# Patient Record
Sex: MALE | Race: ASIAN | Employment: STUDENT | URBAN - METROPOLITAN AREA
[De-identification: names, ages, dates, MRNs, and addresses within clinical notes are randomized per-mention and may not be internally consistent; named-entity substitution may affect disease eponyms.]

---

## 2019-11-25 ENCOUNTER — OFFICE VISIT (OUTPATIENT)
Dept: INTERNAL MEDICINE CLINIC | Facility: CLINIC | Age: 24
End: 2019-11-25
Payer: COMMERCIAL

## 2019-11-25 VITALS
DIASTOLIC BLOOD PRESSURE: 68 MMHG | HEIGHT: 67.9 IN | TEMPERATURE: 98 F | SYSTOLIC BLOOD PRESSURE: 106 MMHG | WEIGHT: 127 LBS | RESPIRATION RATE: 16 BRPM | OXYGEN SATURATION: 97 % | BODY MASS INDEX: 19.47 KG/M2 | HEART RATE: 76 BPM

## 2019-11-25 DIAGNOSIS — Z13.29 THYROID DISORDER SCREEN: ICD-10-CM

## 2019-11-25 DIAGNOSIS — Z13.220 LIPID SCREENING: ICD-10-CM

## 2019-11-25 DIAGNOSIS — Z13.89 SCREENING FOR GENITOURINARY CONDITION: ICD-10-CM

## 2019-11-25 DIAGNOSIS — Z13.0 SCREENING, IRON DEFICIENCY ANEMIA: ICD-10-CM

## 2019-11-25 DIAGNOSIS — Z00.00 LABORATORY EXAMINATION ORDERED AS PART OF A ROUTINE GENERAL MEDICAL EXAMINATION: ICD-10-CM

## 2019-11-25 DIAGNOSIS — Z00.00 ANNUAL PHYSICAL EXAM: Primary | ICD-10-CM

## 2019-11-25 PROCEDURE — 99385 PREV VISIT NEW AGE 18-39: CPT | Performed by: INTERNAL MEDICINE

## 2019-11-25 NOTE — PROGRESS NOTES
HPI:    Patient ID: Tiffani Padilla is a 22year old male. HPI  Tiffani Padilla is a 22year old male who presents for a complete physical exam.   HPI:   Pt complains of nothing    PAST MEDICAL, SOCIAL, FAMILY HISTORIES REVIEWED WITH PT  .       Vincent Bautista Body mass index is 19.37 kg/m².    GENERAL: well developed, well nourished,in no apparent distress  SKIN: no rashes,no suspicious lesions  HEENT: atraumatic, normocephalic,ears and throat are clear  EYES:PERRLA, EOMI, normal optic disk,conjunctiva are shayne

## 2019-11-27 ENCOUNTER — TELEPHONE (OUTPATIENT)
Dept: INTERNAL MEDICINE CLINIC | Facility: CLINIC | Age: 24
End: 2019-11-27

## 2019-11-27 DIAGNOSIS — Z00.00 LABORATORY EXAMINATION ORDERED AS PART OF A ROUTINE GENERAL MEDICAL EXAMINATION: Primary | ICD-10-CM

## 2019-11-27 NOTE — TELEPHONE ENCOUNTER
Please change all lab orders on file over to [facility] - pt is there now to have labs done. Fax #[phone].

## 2019-11-28 LAB
ABSOLUTE BASOPHILS: 48 CELLS/UL (ref 0–200)
ABSOLUTE EOSINOPHILS: 314 CELLS/UL (ref 15–500)
ABSOLUTE LYMPHOCYTES: 1986 CELLS/UL (ref 850–3900)
ABSOLUTE MONOCYTES: 684 CELLS/UL (ref 200–950)
ABSOLUTE NEUTROPHILS: 6470 CELLS/UL (ref 1500–7800)
ALBUMIN/GLOBULIN RATIO: 1.2 (CALC) (ref 1–2.5)
ALBUMIN: 4.6 G/DL (ref 3.6–5.1)
ALKALINE PHOSPHATASE: 90 U/L (ref 40–115)
ALT: 20 U/L (ref 9–46)
APPEARANCE: CLEAR
AST: 17 U/L (ref 10–40)
BASOPHILS: 0.5 %
BILIRUBIN, TOTAL: 0.8 MG/DL (ref 0.2–1.2)
BILIRUBIN: NEGATIVE
BUN: 9 MG/DL (ref 7–25)
CALCIUM: 10.1 MG/DL (ref 8.6–10.3)
CARBON DIOXIDE: 29 MMOL/L (ref 20–32)
CHLORIDE: 97 MMOL/L (ref 98–110)
CHOL/HDLC RATIO: 2.5 (CALC)
CHOLESTEROL, TOTAL: 108 MG/DL
COLOR: YELLOW
CREATININE: 0.92 MG/DL (ref 0.6–1.35)
EGFR IF AFRICN AM: 135 ML/MIN/1.73M2
EGFR IF NONAFRICN AM: 117 ML/MIN/1.73M2
EOSINOPHILS: 3.3 %
GLOBULIN: 3.7 G/DL (CALC) (ref 1.9–3.7)
GLUCOSE: 78 MG/DL (ref 65–99)
GLUCOSE: NEGATIVE
HDL CHOLESTEROL: 44 MG/DL
HEMATOCRIT: 44.4 % (ref 38.5–50)
HEMOGLOBIN A1C: 5.2 % OF TOTAL HGB
HEMOGLOBIN: 14.8 G/DL (ref 13.2–17.1)
KETONES: NEGATIVE
LDL-CHOLESTEROL: 50 MG/DL (CALC)
LEUKOCYTE ESTERASE: NEGATIVE
LYMPHOCYTES: 20.9 %
MCH: 26.8 PG (ref 27–33)
MCHC: 33.3 G/DL (ref 32–36)
MCV: 80.4 FL (ref 80–100)
MONOCYTES: 7.2 %
MPV: 9.7 FL (ref 7.5–12.5)
NEUTROPHILS: 68.1 %
NITRITE: NEGATIVE
NON-HDL CHOLESTEROL: 64 MG/DL (CALC)
OCCULT BLOOD: NEGATIVE
PH: 7 (ref 5–8)
PLATELET COUNT: 323 THOUSAND/UL (ref 140–400)
POTASSIUM: 4.5 MMOL/L (ref 3.5–5.3)
PROTEIN, TOTAL: 8.3 G/DL (ref 6.1–8.1)
PROTEIN: NEGATIVE
RDW: 12.8 % (ref 11–15)
RED BLOOD CELL COUNT: 5.52 MILLION/UL (ref 4.2–5.8)
SODIUM: 134 MMOL/L (ref 135–146)
SPECIFIC GRAVITY: 1.01 (ref 1–1.03)
TRIGLYCERIDES: 55 MG/DL
TSH W/REFLEX TO FT4: 1.57 MIU/L (ref 0.4–4.5)
WHITE BLOOD CELL COUNT: 9.5 THOUSAND/UL (ref 3.8–10.8)